# Patient Record
Sex: MALE | Race: WHITE | NOT HISPANIC OR LATINO | Employment: FULL TIME | URBAN - NONMETROPOLITAN AREA
[De-identification: names, ages, dates, MRNs, and addresses within clinical notes are randomized per-mention and may not be internally consistent; named-entity substitution may affect disease eponyms.]

---

## 2019-12-17 ENCOUNTER — DOCUMENTATION (OUTPATIENT)
Dept: SLEEP MEDICINE | Facility: HOSPITAL | Age: 24
End: 2019-12-17

## 2019-12-17 ENCOUNTER — OFFICE VISIT (OUTPATIENT)
Dept: SLEEP MEDICINE | Facility: HOSPITAL | Age: 24
End: 2019-12-17

## 2019-12-17 VITALS
SYSTOLIC BLOOD PRESSURE: 125 MMHG | BODY MASS INDEX: 33.18 KG/M2 | DIASTOLIC BLOOD PRESSURE: 81 MMHG | HEIGHT: 67 IN | WEIGHT: 211.4 LBS | HEART RATE: 64 BPM | OXYGEN SATURATION: 98 %

## 2019-12-17 DIAGNOSIS — G47.33 OBSTRUCTIVE SLEEP APNEA, ADULT: Primary | ICD-10-CM

## 2019-12-17 PROCEDURE — 99203 OFFICE O/P NEW LOW 30 MIN: CPT | Performed by: INTERNAL MEDICINE

## 2019-12-17 RX ORDER — VALACYCLOVIR HYDROCHLORIDE 1 G/1
1000 TABLET, FILM COATED ORAL DAILY
Refills: 11 | COMMUNITY
Start: 2019-11-25

## 2019-12-17 NOTE — PROGRESS NOTES
New Patient Sleep Medicine Consultation    Encounter Date: 12/17/2019         Patient's PCP: Tommy Najera MD  Referring provider: No ref. provider found  Reason for consultation chief complaint: snoring, witnessed apneas and excessive daytime sleepiness    Alex Raza is a 24 y.o. male whose bedtime is ~ 2330-2231. He  falls asleep after 2 minutes, and is up 0 times per night. He wakes up ~ 0700. He endorses 7-9 hours of sleep. He drinks <1 cups of coffee, 0 teas, and 2-3 sodas per day. He drinks 0 alcoholic beverages per week.    Alex Raza admits to snoring, unrestful sleep, excessive daytime sleepiness, sleepy driving and restless legs at night. He denies cataplexy, sleep paralysis, or hypnagogic hallucinations. He does not take sedatives or hypnotics. He has some sleepiness with driving. He naps daily.    He is a never smoker.       No past medical history on file.  Social History     Socioeconomic History   • Marital status:      Spouse name: Not on file   • Number of children: Not on file   • Years of education: Not on file   • Highest education level: Not on file   , no kids  Works at [employer]  No family history on file.  0 brothers and 1 sisters  Other family history + for: ADHD, DM  FH of sleep disorders: DAD    [place] - 17    Review of Systems:  Constitutional: negative  Eyes: negative  Ears, nose, mouth, throat, and face: negative  Respiratory: negative  Cardiovascular: negative  Gastrointestinal: negative  Genitourinary:negative  Integument/breast: negative  Hematologic/lymphatic: negative  Musculoskeletal:negative  Neurological: negative  Behavioral/Psych: negative  Endocrine: negative  Allergic/Immunologic: negative Patient advised to discuss any positive ROS with PCP.      Vitals:    12/17/19 0824   BP: 125/81   Pulse: 64   SpO2: 98%           12/17/19  0824   Weight: 95.9 kg (211 lb 6.4 oz)       Body mass index is 33.11 kg/m². Patient's Body mass  "index is 33.11 kg/m². BMI is above normal parameters. Recommendations include: referral to primary care.    Neck circumference: 15\"          General: Alert. Cooperative. Well developed. No acute distress.             Head:  Normocephalic. Symmetrical. Atraumatic.              Eyes: Sclera clear. No icterus. PERRLA. Normal EOM.             Ears: No deformities. Normal hearing.             Nose: No septal deviation. No drainage.          Throat: No oral lesions. No thrush. Moist mucous membranes. Trachea midline    Tongue is normal    Dentition is fair with come crowding       Pharynx: Posterior pharyngeal pillars are narrow    Mallampati score of IV (only hard palate visible)    Pharynx is normal with unrermarkable tonsils   Chest Wall:  Normal shape. Symmetric expansion with respiration. No tenderness.          Lungs:  Clear to auscultation bilaterally. No wheezes. No rhonchi. No rales. Respirations regular, even and unlabored.            Heart:  Regular rhythm and normal rate. Normal S1 and S2. No murmur.     Abdomen:  Soft, non-tender and non-distended. Normal bowel sounds. No masses.  Extremities:  Moves all extremities well. No edema.           Pulses: Pulses palpable and equal bilaterally.               Skin: Dry. Intact. No bleeding. No rash.           Neuro: Moves all 4 extremities and cranial nerves grossly intact.  Psychiatric: Normal mood and affect.      Current Outpatient Medications:   •  Cholecalciferol (VITAMIN D3) 1.25 MG (34494 UT) tablet, Take 50,000 Units by mouth., Disp: , Rfl:   •  valACYclovir (VALTREX) 1000 MG tablet, Take 1,000 mg by mouth Daily., Disp: , Rfl: 11    Lab Results   Component Value Date    WBC 5.7 11/26/2019    HGB 14.8 11/26/2019    HCT 44.1 11/26/2019    MCV 85 11/26/2019     11/26/2019     No results found for: GLUCOSE, CALCIUM, NA, K, CO2, CL, BUN, CREATININE, EGFRIFAFRI, EGFRIFNONA, BCR, ANIONGAP  No results found for: INR, PROTIME  No results found for: CKTOTAL, " CKMB, CKMBINDEX, TROPONINI, TROPONINT    No results found for: PHART, WWY9ZDS, PO2ART]    Assessment and Plan:    1. Obstructive sleep apnea undiagnosed new problem with uncertain prognosis  and New (to me), additional work-up planned (4)  1. Check HST  2. RTC in 3 months, call with results   2. Restless leg syndrome/PLMD - self-limited or minor problem    Total visit time 30 min, with total of 15 minutes spent face-to-face counseling patient extensively regarding: Weight loss    RTC in 3 months         This document has been electronically signed by Ramiro Allan MD on December 17, 2019         CC: Tommy Najera MD          No ref. provider found

## 2020-01-15 ENCOUNTER — HOSPITAL ENCOUNTER (OUTPATIENT)
Dept: SLEEP MEDICINE | Facility: HOSPITAL | Age: 25
Discharge: HOME | End: 2020-01-15
Admitting: INTERNAL MEDICINE

## 2020-01-15 DIAGNOSIS — G47.33 OBSTRUCTIVE SLEEP APNEA, ADULT: ICD-10-CM

## 2020-01-15 PROCEDURE — 95806 SLEEP STUDY UNATT&RESP EFFT: CPT | Performed by: INTERNAL MEDICINE

## 2020-01-15 PROCEDURE — 95806 SLEEP STUDY UNATT&RESP EFFT: CPT

## 2020-01-23 ENCOUNTER — TELEPHONE (OUTPATIENT)
Dept: SLEEP MEDICINE | Facility: HOSPITAL | Age: 25
End: 2020-01-23

## 2020-01-23 NOTE — TELEPHONE ENCOUNTER
Patient called and was given results of Home Sleep Test.  Patient understood and follow up appointment was scheduled.

## 2020-01-29 ENCOUNTER — OFFICE VISIT (OUTPATIENT)
Dept: SLEEP MEDICINE | Facility: HOSPITAL | Age: 25
End: 2020-01-29

## 2020-01-29 VITALS
OXYGEN SATURATION: 98 % | SYSTOLIC BLOOD PRESSURE: 130 MMHG | BODY MASS INDEX: 33.29 KG/M2 | HEIGHT: 67 IN | HEART RATE: 87 BPM | DIASTOLIC BLOOD PRESSURE: 71 MMHG | WEIGHT: 212.1 LBS

## 2020-01-29 DIAGNOSIS — G47.19 EXCESSIVE DAYTIME SLEEPINESS: Primary | ICD-10-CM

## 2020-01-29 PROCEDURE — 99213 OFFICE O/P EST LOW 20 MIN: CPT | Performed by: INTERNAL MEDICINE

## 2020-01-29 NOTE — PROGRESS NOTES
CHIEF COMPLAINT: follow up sleep testing    LAST OV: 12/17/2019    HPI:    The patient is a 24 y.o. male.  He returns to sleep clinic to discuss the results of the recent home sleep study performed on 01/15/2020.  The AHI/DEANGELO was 1.6.    INTERVAL MEDICAL HISTORY: No change since last office visit in regard to the patient's bedtime routine, medications, or diagnosis.    MEDICATIONS:   Current Outpatient Medications:   •  Cholecalciferol (VITAMIN D3) 1.25 MG (24692 UT) tablet, Take 50,000 Units by mouth., Disp: , Rfl:   •  valACYclovir (VALTREX) 1000 MG tablet, Take 1,000 mg by mouth Daily., Disp: , Rfl: 11    PHYSICAL EXAM  Vital Signs (last 24 hours)       01/28 0700  -  01/29 0659 01/29 0700  -  01/29 1542   Most Recent    Heart Rate     87     87    BP     130/71     130/71    SpO2 (%)     98     98            01/29/20  1517   Weight: 96.2 kg (212 lb 1.6 oz)     Gen:  No acute distress, alert, oriented  Lungs:  CTA with normal effort   CV:  RRR, no M/R/G  GI:  soft, non-tender  Ext:  no c/c/e    Assessment and Plan:    1. Excessive daytime sleepiness - stable chronic illness and Established, stable (1)  1. No KAR  2. Restless leg syndrome/PLMD - stable chronic illness and Established, worsening (2)  1. Check ferritin  2. Trial of OTC iron with vitamin C  3. RTC in 3 months    The sleep results were discussed in detail with the patient.  All of the patient's questions were answered. He states understanding and agreement with my assessment and plan as above.  Total visit time 20 min, with total of 15 minutes spent face-to-face counseling patient extensively regarding: Weight loss and Lifestyle modifications and treatment of RLS      RTC in 3 months   He agrees to return sooner on a prn basis if sx change.           This document has been electronically signed by Ramiro Allan MD on January 29, 2020           CC: Tommy Najera MD          No ref. provider found

## 2020-08-12 ENCOUNTER — OFFICE VISIT (OUTPATIENT)
Dept: SLEEP MEDICINE | Facility: HOSPITAL | Age: 25
End: 2020-08-12

## 2020-08-12 DIAGNOSIS — G47.19 EXCESSIVE DAYTIME SLEEPINESS: ICD-10-CM

## 2020-08-12 DIAGNOSIS — G25.81 RESTLESS LEGS SYNDROME (RLS): Primary | ICD-10-CM

## 2020-08-12 PROCEDURE — 99441 PR PHYS/QHP TELEPHONE EVALUATION 5-10 MIN: CPT | Performed by: NURSE PRACTITIONER

## 2020-08-12 NOTE — PROGRESS NOTES
Sleep Clinic Follow Up      You have chosen to receive care through a telephone visit. Do you consent to use a telephone visit for your medical care today? Yes    Date: 2020  Primary Care Physician: Tommy Najera MD    Last office visit: 2020 (I reviewed this note)    CC: Follow up: RLS, excessive daytime sleepiness      Interim History:  Since the last visit:    1) Excessive daytime sleepiness -  Alex Raza has had almost complete resolution of daytime sleepiness since getting better control of his restless leg symptoms. He was taking OTC iron supplements for a while but he ran out, so he started eating more mustard throughout the week and has still had great control of symptoms with less frequent arousals.    Sleep Testin. HST on 01/15/2020, AHI of 4.6     Bed time: 5734-3586  Sleep latency: 2-5 minutes  Number of times awakens during the night: 0  Wake time: 0700  Estimated total sleep time at night: 7-9 hours  Caffeine intake: 1 cups of coffee, 0 cups of tea, and 2-3 sodas per day  Alcohol intake: 0 drinks per week  Nap time: rare, less than before   Sleepiness with Driving: rare       2) Patient denies RLS symptoms. As above.    PMHx, FH, SH reviewed and pertinent changes are: unchanged from last office visit on 2020      REVIEW OF SYSTEMS:   Negative for chest pain, SOA, fever, chills, cough, N/V/D, abdominal pain.    Smoking:none      Exam:  Unable to perform physical exam due to conducting telephone visit    No past medical history on file.    Current Outpatient Medications:   •  Cholecalciferol (VITAMIN D3) 1.25 MG (07191 UT) tablet, Take 50,000 Units by mouth., Disp: , Rfl:   •  valACYclovir (VALTREX) 1000 MG tablet, Take 1,000 mg by mouth Daily., Disp: , Rfl: 11      Assessment and Plan:    1. Excessive daytime sleepiness Established, improved (1)  1. Improved since controlling RLS  2. Follow up PRN, may offer in-lab PSG in the future if symptoms  reoccur  2. Restless leg syndrome/ Garcia-Ekbom disease (RLS/WED) Established, improved (1)   1. Continue either OTC iron supplement or natural remedies, such a mustard/vinegar  2. Follow up PRN      This visit has been rescheduled as a phone visit to comply with patient safety concerns in accordance with CDC recommendations. Total time of discussion was 5 minutes.    4 of 5 minutes spent telephone counseling patient extensively regarding:   Sleep hygiene and Medication changes      RTC in 12 months PRN. Patient agrees to return sooner if changes in symptoms.        This document has been electronically signed by HEDY Parsons on August 12, 2020 14:25          CC: Tommy Najera MD          No ref. provider found